# Patient Record
Sex: FEMALE | Race: WHITE
[De-identification: names, ages, dates, MRNs, and addresses within clinical notes are randomized per-mention and may not be internally consistent; named-entity substitution may affect disease eponyms.]

---

## 2018-12-08 ENCOUNTER — HOSPITAL ENCOUNTER (EMERGENCY)
Dept: HOSPITAL 14 - H.ER | Age: 38
Discharge: HOME | End: 2018-12-08
Payer: SELF-PAY

## 2018-12-08 VITALS
DIASTOLIC BLOOD PRESSURE: 86 MMHG | OXYGEN SATURATION: 98 % | SYSTOLIC BLOOD PRESSURE: 136 MMHG | HEART RATE: 89 BPM | TEMPERATURE: 98 F | RESPIRATION RATE: 16 BRPM

## 2018-12-08 DIAGNOSIS — L50.9: Primary | ICD-10-CM

## 2018-12-08 DIAGNOSIS — Z87.442: ICD-10-CM

## 2018-12-08 PROCEDURE — 99282 EMERGENCY DEPT VISIT SF MDM: CPT

## 2018-12-08 PROCEDURE — 96372 THER/PROPH/DIAG INJ SC/IM: CPT

## 2018-12-08 NOTE — ED PDOC
HPI: Skin/Bite Injury


Time Seen by Provider: 12/08/18 21:30


Chief Complaint (Nursing): Abnormal Skin Integrity


Chief Complaint (Provider): Rash, itchy x 2 days 


History Per: Patient


History/Exam Limitations: no limitations


Onset/Duration Of Symptoms: Days


Current Symptoms Are (Timing): Still Present


Quality Of Symptoms: Itching


Severity: Severe


Additional Complaint(s): 





37 yo female with no medical problems presents for evaluation of itchy rash on 

body. Pt states it started 2 days ago. Pt reports benadryl initially working 

however states today it did not help rash improve. No similar in the past. No 

new foods, lotions, etc. Denies SOB. Denies swelling in the throat, mouth or 

tongue. 





Past Medical History


Reviewed: Historical Data, Nursing Documentation, Vital Signs


Vital Signs: 





                                Last Vital Signs











Temp  98 F   12/08/18 21:40


 


Pulse  89   12/08/18 21:40


 


Resp  16   12/08/18 21:40


 


BP  136/86   12/08/18 21:40


 


Pulse Ox  98   12/08/18 21:40














- Medical History


PMH: Kidney Stones





- Surgical History


Surgical History: No Surg Hx





- Family History


Family History: States: No Known Family Hx





- Home Medications


Home Medications: 


                                Ambulatory Orders











 Medication  Instructions  Recorded


 


Tamsulosin [Flomax] 0.4 mg PO DAILY #10 cap 10/22/16


 


traMADol [Ultram] 50 mg PO Q6 PRN #16 tab 10/22/16


 


Famotidine [Pepcid] 20 mg PO DAILY #6 tab 12/08/18


 


predniSONE [predniSONE Tab] 20 mg PO DAILY #12 tab 12/08/18














- Allergies


Allergies/Adverse Reactions: 


                                    Allergies











Allergy/AdvReac Type Severity Reaction Status Date / Time


 


No Known Allergies Allergy   Verified 10/22/16 04:03














Review of Systems


ROS Statement: Except As Marked, All Systems Reviewed And Found Negative


Constitutional: Negative for: Fever, Chills


Cardiovascular: Negative for: Chest Pain, Palpitations


Respiratory: Negative for: Cough, Shortness of Breath, SOB with Exertion


Skin: Positive for: Rash





Physical Exam





- Reviewed


Nursing Documentation Reviewed: Yes


Vital Signs Reviewed: Yes





- Physical Exam


Appears: Positive for: Well, Non-toxic, No Acute Distress


Head Exam: Positive for: ATRAUMATIC, NORMAL INSPECTION, NORMOCEPHALIC


Skin: Positive for: Warm.  Negative for: Normal Color (Erythematous 

maculopapular rash, with blanching, caried shap and size on the trunk and 

extremities )


Eye Exam: Positive for: Normal appearance


ENT: Positive for: Normal ENT Inspection


Neck: Positive for: Normal


Cardiovascular/Chest: Positive for: Regular Rate, Rhythm


Respiratory: Positive for: Normal Breath Sounds.  Negative for: Accessory Muscle

Use


Back: Positive for: Normal Inspection


Extremity: Positive for: Normal ROM


Neurologic/Psych: Positive for: Alert, Oriented, Gait





- ECG


O2 Sat by Pulse Oximetry: 98





Medical Decision Making


Medical Decision Making: 





IM solumedrol given with PO pepcid in ER. Pt reports less itchiness and improved

rash on re-evaluation. 





Disposition





- Clinical Impression


Clinical Impression: 


 Hives








- Disposition


Disposition Time: 23:27


Condition: STABLE


Prescriptions: 


Famotidine [Pepcid] 20 mg PO DAILY #6 tab


predniSONE [predniSONE Tab] 20 mg PO DAILY #12 tab


Instructions:  Hives (DC)


Forms:  [facility] (English)